# Patient Record
Sex: MALE | Race: BLACK OR AFRICAN AMERICAN | NOT HISPANIC OR LATINO | Employment: STUDENT | URBAN - METROPOLITAN AREA
[De-identification: names, ages, dates, MRNs, and addresses within clinical notes are randomized per-mention and may not be internally consistent; named-entity substitution may affect disease eponyms.]

---
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
## 2023-06-26 ENCOUNTER — HOSPITAL ENCOUNTER (EMERGENCY)
Facility: HOSPITAL | Age: 18
Discharge: HOME | End: 2023-06-26
Attending: EMERGENCY MEDICINE
Payer: MEDICAID

## 2023-06-26 VITALS
WEIGHT: 132.25 LBS | SYSTOLIC BLOOD PRESSURE: 124 MMHG | TEMPERATURE: 98 F | HEART RATE: 72 BPM | OXYGEN SATURATION: 99 % | DIASTOLIC BLOOD PRESSURE: 84 MMHG | RESPIRATION RATE: 12 BRPM | BODY MASS INDEX: 18.98 KG/M2

## 2023-06-26 DIAGNOSIS — Z71.1 CONCERN ABOUT STD IN MALE WITHOUT DIAGNOSIS: ICD-10-CM

## 2023-06-26 DIAGNOSIS — R36.9 PENILE DISCHARGE: Primary | ICD-10-CM

## 2023-06-26 LAB
AMORPH CRY UR QL COMP ASSIST: ABNORMAL
BACTERIA #/AREA URNS AUTO: ABNORMAL /HPF
BILIRUB UR QL STRIP: NEGATIVE
CLARITY UR REFRACT.AUTO: ABNORMAL
COLOR UR AUTO: YELLOW
GLUCOSE UR QL STRIP: NEGATIVE
HGB UR QL STRIP: ABNORMAL
KETONES UR QL STRIP: NEGATIVE
LEUKOCYTE ESTERASE UR QL STRIP: ABNORMAL
MICROSCOPIC COMMENT: ABNORMAL
NITRITE UR QL STRIP: NEGATIVE
PH UR STRIP: 8 [PH] (ref 5–8)
PROT UR QL STRIP: ABNORMAL
RBC #/AREA URNS AUTO: 26 /HPF (ref 0–4)
SP GR UR STRIP: 1.02 (ref 1–1.03)
URN SPEC COLLECT METH UR: ABNORMAL
WBC #/AREA URNS AUTO: >100 /HPF (ref 0–5)

## 2023-06-26 PROCEDURE — 25000003 PHARM REV CODE 250: Performed by: PHYSICIAN ASSISTANT

## 2023-06-26 PROCEDURE — 99284 EMERGENCY DEPT VISIT MOD MDM: CPT

## 2023-06-26 PROCEDURE — 87591 N.GONORRHOEAE DNA AMP PROB: CPT | Performed by: PHYSICIAN ASSISTANT

## 2023-06-26 PROCEDURE — 63600175 PHARM REV CODE 636 W HCPCS: Performed by: PHYSICIAN ASSISTANT

## 2023-06-26 PROCEDURE — 87086 URINE CULTURE/COLONY COUNT: CPT | Performed by: PHYSICIAN ASSISTANT

## 2023-06-26 PROCEDURE — 81001 URINALYSIS AUTO W/SCOPE: CPT | Performed by: PHYSICIAN ASSISTANT

## 2023-06-26 PROCEDURE — 96372 THER/PROPH/DIAG INJ SC/IM: CPT | Performed by: PHYSICIAN ASSISTANT

## 2023-06-26 RX ORDER — CEFTRIAXONE 500 MG/1
500 INJECTION, POWDER, FOR SOLUTION INTRAMUSCULAR; INTRAVENOUS
Status: COMPLETED | OUTPATIENT
Start: 2023-06-26 | End: 2023-06-26

## 2023-06-26 RX ORDER — DOXYCYCLINE HYCLATE 100 MG
100 TABLET ORAL
Status: COMPLETED | OUTPATIENT
Start: 2023-06-26 | End: 2023-06-26

## 2023-06-26 RX ORDER — DOXYCYCLINE 100 MG/1
100 CAPSULE ORAL EVERY 12 HOURS
Qty: 14 CAPSULE | Refills: 0 | Status: SHIPPED | OUTPATIENT
Start: 2023-06-26 | End: 2023-07-03

## 2023-06-26 RX ADMIN — DOXYCYCLINE HYCLATE 100 MG: 100 TABLET, COATED ORAL at 09:06

## 2023-06-26 RX ADMIN — CEFTRIAXONE SODIUM 500 MG: 500 INJECTION, POWDER, FOR SOLUTION INTRAMUSCULAR; INTRAVENOUS at 09:06

## 2023-06-27 NOTE — ED NOTES
Discharge home, states understanding to follow up as directed. Ambulates out of ED without difficulty. RX given, tolerated shot time without difficulty

## 2023-06-27 NOTE — DISCHARGE INSTRUCTIONS
You were given an injection of antibiotics for treatment of gonorrhea and 1st dose of doxycycline for treatment of chlamydia.  You will need to continue doxycycline every 12 hours for the next 7 days for full treatment of chlamydia.  Your results are in process and will come back in 1-3 days.  We will call you if your positive.  You can refer to your chart all mine and review your labs when they come back.    Avoid all sexual activity until your symptoms resolve +2 more weeks.    Follow up with PCP regularly for STD yearly checks.  Return to the ER for new or worsening symptoms.

## 2023-06-27 NOTE — ED PROVIDER NOTES
Encounter Date: 6/26/2023       History     Chief Complaint   Patient presents with    Groin Pain     Pt arrives c/o groin pain that began yesterday. Denies burning while urinating. State brown discharge and sexually active.     9:42 PM  Patient is a 18-year-old male with a history of ADHD who presents to [facility] ED via POV for emergent evaluation of penile discharge onset yesterday.     He has had brown discharge since yesterday.  Mild dysuria. No hematuria.  He denies any pain including groin pain, penile pain, testicular pain.  He is sexually active and does not use protection. He has not noted any rashes.    This is the extent of his medical complaints at this time.      Review of patient's allergies indicates:  No Known Allergies  Past Medical History:   Diagnosis Date    ADHD (attention deficit hyperactivity disorder)      No past surgical history on file.  Family History   Problem Relation Age of Onset    Hypertension Mother     Hypertension Father     Hypertension Maternal Aunt     Hypertension Maternal Uncle     Hypertension Paternal Aunt     Hypertension Paternal Uncle     Hypertension Maternal Grandmother     Arrhythmia Maternal Grandfather     Cardiomyopathy Maternal Grandfather     Hypertension Maternal Grandfather     Hypertension Paternal Grandmother     Hypertension Paternal Grandfather     Heart attacks under age 50 Neg Hx     Pacemaker/defibrilator Neg Hx      Social History     Tobacco Use    Smoking status: Never    Smokeless tobacco: Never   Substance Use Topics    Alcohol use: Not Currently    Drug use: Not Currently     Review of Systems   Constitutional:  Negative for chills, diaphoresis and fever.   HENT:  Negative for sore throat.    Respiratory:  Negative for shortness of breath.    Cardiovascular:  Negative for chest pain.   Gastrointestinal:  Negative for abdominal pain, diarrhea, nausea and vomiting.   Genitourinary:  Positive for dysuria and penile discharge. Negative for hematuria, penile  "pain, penile swelling, scrotal swelling and testicular pain.   Musculoskeletal:  Negative for back pain.   Skin:  Negative for rash.   Neurological:  Negative for weakness.   Hematological:  Does not bruise/bleed easily.     Physical Exam     Initial Vitals [06/26/23 2051]   BP Pulse Resp Temp SpO2   124/84 72 12 97.8 °F (36.6 °C) 99 %      MAP       --         Physical Exam    Vitals reviewed.  Constitutional: He appears well-developed and well-nourished. He is not diaphoretic. He is cooperative.  Non-toxic appearance. He does not have a sickly appearance. He does not appear ill. No distress.   HENT:   Head: Normocephalic and atraumatic.   Nose: Nose normal.   Mouth/Throat: No trismus in the jaw.   Eyes: Conjunctivae and EOM are normal.   Neck:   Normal range of motion.  Pulmonary/Chest: No accessory muscle usage. No tachypnea. No respiratory distress.   Abdominal: He exhibits no distension.   Genitourinary:    Genitourinary Comments: He has a soiled towel with his penile discharge. Appears brown.     Musculoskeletal:         General: Normal range of motion.      Cervical back: Normal range of motion.     Neurological: He is alert. He has normal strength.   Skin: Skin is dry. No pallor.       ED Course   Procedures  Labs Reviewed   C. TRACHOMATIS/N. GONORRHOEAE BY AMP DNA   URINALYSIS, REFLEX TO URINE CULTURE          Imaging Results    None          Medications   cefTRIAXone injection 500 mg (500 mg Intramuscular Given 6/26/23 2146)   doxycycline tablet 100 mg (100 mg Oral Given 6/26/23 2146)     Medical Decision Making:   Initial Assessment:   Patient is a 18-year-old male with a history of ADHD who presents to [facility] ED via POV for emergent evaluation of penile discharge onset yesterday.   Differential Diagnosis:   Includes but is not limited to STDs, UTI.  He denies a rash.  He has mild dysuria, but denies any pain whatsoever despite triage report of "groin pain". I doubt acute surgical abdomen.  Clinical Tests: "   Lab Tests: Ordered  ED Management:  Will screen for gonorrhea and chlamydia.  I sent text message to his phone so he can have access to his my [facility].  He would like empiric treatment due to his penile discharge.  He declines HIV and hep C testing today and would like to do this with his PCP.  I advised regular STD screens if he is sexually active.  Will give Rocephin intramuscular injection and started on doxycycline b.i.d. for 7 days.            ED Course as of 06/26/23 2147 Mon Jun 26, 2023 2122 BP: 124/84 [CL]   2122 Temp: 97.8 °F (36.6 °C) [CL]   2122 Pulse: 72 [CL]   2122 Resp: 12 [CL]   2122 SpO2: 99 % [CL]      ED Course User Index  [CL] Vianca Emmanuel PA-C                 Clinical Impression:   Final diagnoses:  [R36.9] Penile discharge (Primary)  [Z71.1] Concern about STD in male without diagnosis        ED Disposition Condition    Discharge Stable          ED Prescriptions       Medication Sig Dispense Start Date End Date Auth. Provider    doxycycline (VIBRAMYCIN) 100 MG Cap Take 1 capsule (100 mg total) by mouth every 12 (twelve) hours. for 7 days 14 capsule 6/26/2023 7/3/2023 Vianca Emmanuel PA-C          Follow-up Information       Follow up With Specialties Details Why Contact Info    Jessenia Bridges MD Family Medicine Schedule an appointment as soon as possible for a visit   [address] [facility] [address]  [phone]      [facility] - Emergency Dept Emergency Medicine  If symptoms worsen [facility] [facility] [id number] [phone]             Vianca Emmanuel PA-C  06/26/23 2147

## 2023-06-27 NOTE — ED TRIAGE NOTES
Patient reports that he is having brown colored discharge from his penis. States that this began yesterday. States that he is sexually active. Reports that he is not having any pain to area. Reports that it is just the drainage from his penis.

## 2023-06-28 LAB
BACTERIA UR CULT: NORMAL
C TRACH DNA SPEC QL NAA+PROBE: DETECTED
N GONORRHOEA DNA SPEC QL NAA+PROBE: DETECTED

## 2023-12-05 ENCOUNTER — HOSPITAL ENCOUNTER (EMERGENCY)
Facility: HOSPITAL | Age: 18
Discharge: HOME | End: 2023-12-05
Attending: EMERGENCY MEDICINE
Payer: MEDICAID

## 2023-12-05 VITALS
HEART RATE: 90 BPM | RESPIRATION RATE: 16 BRPM | OXYGEN SATURATION: 99 % | WEIGHT: 132.25 LBS | DIASTOLIC BLOOD PRESSURE: 77 MMHG | SYSTOLIC BLOOD PRESSURE: 126 MMHG | TEMPERATURE: 98 F | BODY MASS INDEX: 18.98 KG/M2

## 2023-12-05 DIAGNOSIS — Z71.1 CONCERN ABOUT STD IN MALE WITHOUT DIAGNOSIS: ICD-10-CM

## 2023-12-05 DIAGNOSIS — R36.9 PENILE DISCHARGE: ICD-10-CM

## 2023-12-05 DIAGNOSIS — N48.9 PENILE LESION: ICD-10-CM

## 2023-12-05 DIAGNOSIS — Z20.2 EXPOSURE TO STD: Primary | ICD-10-CM

## 2023-12-05 LAB
HCV AB SERPL QL IA: NORMAL
HIV 1+2 AB+HIV1 P24 AG SERPL QL IA: NORMAL

## 2023-12-05 PROCEDURE — 99284 EMERGENCY DEPT VISIT MOD MDM: CPT

## 2023-12-05 PROCEDURE — 63600175 PHARM REV CODE 636 W HCPCS: Performed by: EMERGENCY MEDICINE

## 2023-12-05 PROCEDURE — 87661 TRICHOMONAS VAGINALIS AMPLIF: CPT | Performed by: PHYSICIAN ASSISTANT

## 2023-12-05 PROCEDURE — 86803 HEPATITIS C AB TEST: CPT | Performed by: PHYSICIAN ASSISTANT

## 2023-12-05 PROCEDURE — 25000003 PHARM REV CODE 250: Performed by: EMERGENCY MEDICINE

## 2023-12-05 PROCEDURE — 87389 HIV-1 AG W/HIV-1&-2 AB AG IA: CPT | Performed by: PHYSICIAN ASSISTANT

## 2023-12-05 PROCEDURE — 87529 HSV DNA AMP PROBE: CPT | Mod: 59 | Performed by: EMERGENCY MEDICINE

## 2023-12-05 PROCEDURE — 86592 SYPHILIS TEST NON-TREP QUAL: CPT | Performed by: EMERGENCY MEDICINE

## 2023-12-05 PROCEDURE — 96372 THER/PROPH/DIAG INJ SC/IM: CPT | Performed by: EMERGENCY MEDICINE

## 2023-12-05 RX ORDER — CEFTRIAXONE 500 MG/1
500 INJECTION, POWDER, FOR SOLUTION INTRAMUSCULAR; INTRAVENOUS
Status: COMPLETED | OUTPATIENT
Start: 2023-12-05 | End: 2023-12-05

## 2023-12-05 RX ORDER — DOXYCYCLINE HYCLATE 100 MG
100 TABLET ORAL
Status: COMPLETED | OUTPATIENT
Start: 2023-12-05 | End: 2023-12-05

## 2023-12-05 RX ORDER — DOXYCYCLINE 100 MG/1
100 CAPSULE ORAL 2 TIMES DAILY
Qty: 20 CAPSULE | Refills: 0 | Status: SHIPPED | OUTPATIENT
Start: 2023-12-05 | End: 2023-12-15

## 2023-12-05 RX ORDER — ONDANSETRON 4 MG/1
4 TABLET, ORALLY DISINTEGRATING ORAL
Status: COMPLETED | OUTPATIENT
Start: 2023-12-05 | End: 2023-12-05

## 2023-12-05 RX ADMIN — ONDANSETRON 4 MG: 4 TABLET, ORALLY DISINTEGRATING ORAL at 05:12

## 2023-12-05 RX ADMIN — CEFTRIAXONE SODIUM 500 MG: 500 INJECTION, POWDER, FOR SOLUTION INTRAMUSCULAR; INTRAVENOUS at 04:12

## 2023-12-05 RX ADMIN — DOXYCYCLINE HYCLATE 100 MG: 100 TABLET, FILM COATED ORAL at 04:12

## 2023-12-05 NOTE — ED NOTES
Attempted to collect blood. Stuck pt. Once but did not have blood return. Pt. Refuses to have second attempt. Dr. Gutierrez notified.

## 2023-12-05 NOTE — ED PROVIDER NOTES
Encounter Date: 12/5/2023       History     Chief Complaint   Patient presents with    STD CHECK     Blood in urine, discharge     Krupa is a 18 you male o/w healthy here for emergent evaluation of discharge from penis that started yesterday. Reports also seeing a little blood when he finished peeing but denies dysuria. No fever or chills. No testicle or groin pain .denies nausea/vomiting, abdominal pain, fever or chills. Reports he has had unprotected sex x 1. Female partner. Was seen here for similar in June, was positive for both chlamydia and gonorrhea, reports was treated and completed his meds, has been fine until yesterday.     The history is provided by the patient. No  was used.     Review of patient's allergies indicates:  No Known Allergies  Past Medical History:   Diagnosis Date    ADHD (attention deficit hyperactivity disorder)      No past surgical history on file.  Family History   Problem Relation Age of Onset    Hypertension Mother     Hypertension Father     Hypertension Maternal Aunt     Hypertension Maternal Uncle     Hypertension Paternal Aunt     Hypertension Paternal Uncle     Hypertension Maternal Grandmother     Arrhythmia Maternal Grandfather     Cardiomyopathy Maternal Grandfather     Hypertension Maternal Grandfather     Hypertension Paternal Grandmother     Hypertension Paternal Grandfather     Heart attacks under age 50 Neg Hx     Pacemaker/defibrilator Neg Hx      Social History     Tobacco Use    Smoking status: Never    Smokeless tobacco: Never   Substance Use Topics    Alcohol use: Not Currently    Drug use: Not Currently     Review of Systems   Constitutional:  Positive for activity change. Negative for appetite change, chills, fatigue and fever.   HENT:  Negative for congestion.    Eyes:  Negative for redness.   Respiratory:  Negative for cough and shortness of breath.    Gastrointestinal:  Negative for abdominal pain, diarrhea, nausea and vomiting.    Genitourinary:  Positive for hematuria and penile discharge. Negative for dysuria.   Musculoskeletal:  Negative for myalgias.   Skin:  Negative for rash.   Allergic/Immunologic: Negative for food allergies.       Physical Exam     Initial Vitals [12/05/23 1514]   BP Pulse Resp Temp SpO2   129/78 88 18 98.1 °F (36.7 °C) 100 %      MAP       --         Physical Exam    Vitals reviewed.  Constitutional: He appears well-developed and well-nourished. No distress.   HENT:   Head: Normocephalic.   Mouth/Throat: No oropharyngeal exudate.   Eyes: Conjunctivae are normal.   Cardiovascular:  Normal rate, regular rhythm and normal heart sounds.           Pulmonary/Chest: Breath sounds normal. No respiratory distress.   Abdominal: Abdomen is soft. He exhibits no distension. There is no abdominal tenderness.   Genitourinary: Discharge found.    Genitourinary Comments: Mikael 5 male circ , with noted non tender sores to the dorsum of penis ulcerated, also with thick purulence discharge from penis, no noted rash present to the suprapubic area or to testicles, scrotum normal       Skin: Skin is warm. Capillary refill takes less than 2 seconds. No rash noted.   Psychiatric: He has a normal mood and affect.         ED Course   Procedures  Labs Reviewed   C. TRACHOMATIS/N. GONORRHOEAE BY AMP DNA   C. TRACHOMATIS/N. GONORRHOEAE BY AMP DNA   TRICHOMONAS VAGINALIS, RNA,QUAL, URINE   HIV 1 / 2 ANTIBODY   HEPATITIS C ANTIBODY   URINALYSIS, REFLEX TO URINE CULTURE   RPR   HERPES SIMPLEX VIRUS (HSV) TYPE 1 & 2 DNA BY PCR          Imaging Results    None          Medications   cefTRIAXone injection 500 mg (500 mg Intramuscular Given 12/5/23 1656)   doxycycline tablet 100 mg (100 mg Oral Given 12/5/23 1656)   ondansetron disintegrating tablet 4 mg (4 mg Oral Given 12/5/23 5909)     Medical Decision Making  Krupa presents for emergent evaluation of penile discharge, noted to have lesions to his dorsum penis. He did not know about these  lesions and did not know they were present, denies tenderness-which makes me concerned that is c/w chancre from syphilis. Will order screening labs, including HSV and RPR.  Patient initially denying blood work however we were able to convince him to complete the blood work.  Treated with ceftriaxone and doxy for likely STI as well.  Safe sex practices and clear return to ER instructions were reviewed.     Patient gave his mother's cell phone number to call with results ( the [phone] number) .  He would prefer us to call his mom and gave us full clearance to tell his mom any results.     Amount and/or Complexity of Data Reviewed  External Data Reviewed: notes.  Labs: ordered.    Risk  Prescription drug management.                                      Clinical Impression:  Final diagnoses:  [Z20.2] Exposure to STD (Primary)  [Z71.1] Concern about STD in male without diagnosis  [R36.9] Penile discharge  [N48.9] Penile lesion          ED Disposition Condition    Discharge Stable          ED Prescriptions       Medication Sig Dispense Start Date End Date Auth. Provider    doxycycline (VIBRAMYCIN) 100 MG Cap Take 1 capsule (100 mg total) by mouth 2 (two) times daily. for 10 days 20 capsule 12/5/2023 12/15/2023 Anabela Gutierrez MD          Follow-up Information    None          Anabela Gutierrez MD  12/05/23 2524

## 2023-12-06 LAB
RPR SER QL: NORMAL
SPECIMEN SOURCE: NORMAL
T VAGINALIS RRNA SPEC QL NAA+PROBE: NEGATIVE

## 2023-12-07 LAB
HSV-1 DNA BY PCR: NEGATIVE
HSV-2 DNA BY PCR: NEGATIVE

## 2024-03-15 ENCOUNTER — HOSPITAL ENCOUNTER (EMERGENCY)
Facility: HOSPITAL | Age: 19
Discharge: HOME | End: 2024-03-15
Attending: STUDENT IN AN ORGANIZED HEALTH CARE EDUCATION/TRAINING PROGRAM
Payer: MEDICAID

## 2024-03-15 VITALS
HEIGHT: 67 IN | RESPIRATION RATE: 16 BRPM | OXYGEN SATURATION: 99 % | DIASTOLIC BLOOD PRESSURE: 68 MMHG | HEART RATE: 98 BPM | SYSTOLIC BLOOD PRESSURE: 114 MMHG | BODY MASS INDEX: 19.62 KG/M2 | TEMPERATURE: 99 F | WEIGHT: 125 LBS

## 2024-03-15 DIAGNOSIS — R11.2 NAUSEA AND VOMITING, UNSPECIFIED VOMITING TYPE: Primary | ICD-10-CM

## 2024-03-15 DIAGNOSIS — R19.7 DIARRHEA, UNSPECIFIED TYPE: ICD-10-CM

## 2024-03-15 PROCEDURE — 99284 EMERGENCY DEPT VISIT MOD MDM: CPT

## 2024-03-15 RX ORDER — DICYCLOMINE HYDROCHLORIDE 20 MG/1
20 TABLET ORAL 2 TIMES DAILY
Qty: 20 TABLET | Refills: 0 | Status: SHIPPED | OUTPATIENT
Start: 2024-03-15 | End: 2024-04-14

## 2024-03-15 RX ORDER — DICYCLOMINE HYDROCHLORIDE 10 MG/1
20 CAPSULE ORAL
Status: DISCONTINUED | OUTPATIENT
Start: 2024-03-15 | End: 2024-03-15

## 2024-03-15 RX ORDER — ONDANSETRON 4 MG/1
4 TABLET, ORALLY DISINTEGRATING ORAL
Status: DISCONTINUED | OUTPATIENT
Start: 2024-03-15 | End: 2024-03-15 | Stop reason: HOSPADM

## 2024-03-15 RX ORDER — ONDANSETRON 4 MG/1
4 TABLET, ORALLY DISINTEGRATING ORAL EVERY 6 HOURS PRN
Qty: 12 TABLET | Refills: 0 | Status: SHIPPED | OUTPATIENT
Start: 2024-03-15

## 2024-03-15 RX ORDER — ONDANSETRON HYDROCHLORIDE 2 MG/ML
4 INJECTION, SOLUTION INTRAVENOUS
Status: DISCONTINUED | OUTPATIENT
Start: 2024-03-15 | End: 2024-03-15

## 2024-03-15 RX ORDER — DICYCLOMINE HYDROCHLORIDE 10 MG/1
20 CAPSULE ORAL
Status: DISCONTINUED | OUTPATIENT
Start: 2024-03-15 | End: 2024-03-15 | Stop reason: HOSPADM

## 2024-03-15 NOTE — ED PROVIDER NOTES
Encounter Date: 3/15/2024       History     Chief Complaint   Patient presents with    Diarrhea    Vomiting    Chills     18-year-old male presents ER for evaluation of abdominal pain, nausea vomiting and diarrhea that started earlier today.  He reports some abdominal cramping.  Had 1 episode of diarrhea.  No blood in stool or black tarry stool.  Denies any flank pain or UTI symptoms.  No fever chills.  Denies any known sick contacts.  No changes in diet or medication.  No recent long travel or hospitalizations.  No URI symptoms.  Did not take any medicine prior to arrival to ER today.    Have any symptoms at this time.  He states that symptoms have fully resolved.    The history is provided by the patient.     Review of patient's allergies indicates:  No Known Allergies  Past Medical History:   Diagnosis Date    ADHD (attention deficit hyperactivity disorder)      History reviewed. No pertinent surgical history.  Family History   Problem Relation Age of Onset    Hypertension Mother     Hypertension Father     Hypertension Maternal Aunt     Hypertension Maternal Uncle     Hypertension Paternal Aunt     Hypertension Paternal Uncle     Hypertension Maternal Grandmother     Arrhythmia Maternal Grandfather     Cardiomyopathy Maternal Grandfather     Hypertension Maternal Grandfather     Hypertension Paternal Grandmother     Hypertension Paternal Grandfather     Heart attacks under age 50 Neg Hx     Pacemaker/defibrilator Neg Hx      Social History     Tobacco Use    Smoking status: Never    Smokeless tobacco: Never   Substance Use Topics    Alcohol use: Not Currently    Drug use: Not Currently     Review of Systems   Constitutional:  Negative for chills and fever.   HENT:  Negative for congestion.    Eyes:  Negative for visual disturbance.   Respiratory:  Negative for shortness of breath.    Cardiovascular:  Negative for chest pain.   Gastrointestinal:  Positive for abdominal pain, diarrhea, nausea and vomiting.    Genitourinary:  Negative for dysuria and flank pain.   Musculoskeletal:  Negative for myalgias.   Skin:  Negative for rash.   Allergic/Immunologic: Negative for immunocompromised state.   Neurological:  Negative for weakness and numbness.   Hematological:  Does not bruise/bleed easily.   Psychiatric/Behavioral:  Negative for confusion.        Physical Exam     Initial Vitals [03/15/24 1602]   BP Pulse Resp Temp SpO2   114/68 98 16 98.8 °F (37.1 °C) 99 %      MAP       --         Physical Exam    Vitals reviewed.  Constitutional: He appears well-developed and well-nourished. He is not diaphoretic. No distress.   HENT:   Head: Normocephalic and atraumatic.   Eyes: Conjunctivae and EOM are normal.   Neck: Neck supple.   Cardiovascular:  Normal rate, regular rhythm, normal heart sounds and intact distal pulses.           Pulmonary/Chest: Breath sounds normal. No respiratory distress.   Abdominal: He exhibits distension. There is abdominal tenderness (mild diffuse).   Musculoskeletal:         General: Normal range of motion.      Cervical back: Neck supple.     Neurological: He is alert and oriented to person, place, and time.   Skin: Skin is warm.         ED Course   Procedures  Labs Reviewed   CBC W/ AUTO DIFFERENTIAL   COMPREHENSIVE METABOLIC PANEL   LIPASE   URINALYSIS, REFLEX TO URINE CULTURE          Imaging Results    None          Medications   sodium chloride 0.9% bolus 1,000 mL 1,000 mL (has no administration in time range)   ondansetron disintegrating tablet 4 mg (has no administration in time range)   dicyclomine capsule 20 mg (has no administration in time range)     Medical Decision Making  Differential diagnosis    Gastritis, gastroenteritis, viral syndrome, electrolyte derangement, UTI, dehydration    Amount and/or Complexity of Data Reviewed  Labs: ordered.    Risk  Prescription drug management.         APC / Resident Notes:   Patient seen in the ER promptly upon arrival.  He is afebrile, no acute  distress.  Physical examination reveals mild diffuse tenderness on palpation throughout the abdomen.  Abdomen is soft, nondistended.  No CVA tenderness on exam.  IV access established, labs ordered, fluids given.  Patient given Bentyl and Zofran.        ED Course as of 03/15/24 2030   Fri Mar 15, 2024   1914 Informed by staff that patient does not want any blood work or IV medication.  He wants oral medication and to be discharged immediately. [AJ]   1914 He does not have any symptoms at this time. [AJ]   2029 Patient tolerated the Zofran and Bentyl given to him.  Will prescribed home on Zofran and Bentyl.  Will advise a bland diet and slowly progress diet as tolerated.  Explained to patient that he may require further lab work and imaging to determine cause of symptoms.  Suspect possible gastroenteritis however other abdominal etiology not excluded.  Patient walked out of the emergency room in stable condition [AJ]      ED Course User Index  [AJ] Stephanie Greene PA-C                           Clinical Impression:  Final diagnoses:  [R11.2] Nausea and vomiting, unspecified vomiting type (Primary)  [R19.7] Diarrhea, unspecified type          ED Disposition Condition    Discharge Stable          ED Prescriptions       Medication Sig Dispense Start Date End Date Auth. Provider    ondansetron (ZOFRAN-ODT) 4 MG TbDL Take 1 tablet (4 mg total) by mouth every 6 (six) hours as needed. 12 tablet 3/15/2024 -- Stephanie Greene PA-C    dicyclomine (BENTYL) 20 mg tablet Take 1 tablet (20 mg total) by mouth 2 (two) times daily. 20 tablet 3/15/2024 4/14/2024 Stephanie Greene PA-C          Follow-up Information       Follow up With Specialties Details Why Contact Info Additional Information    [facility]   [address]  [facility] [address] [phone] [facility] Please park in surface lot and check in at central registration desk              Stephanie Greene PA-C  03/15/24 2030

## 2024-03-15 NOTE — ED TRIAGE NOTES
Krupa Faith, a 18 y.o. male presents to the ED w/ complaint of abdominal pain    Triage note:  Chief Complaint   Patient presents with    Diarrhea    Vomiting    Chills     Review of patient's allergies indicates:  No Known Allergies  Past Medical History:   Diagnosis Date    ADHD (attention deficit hyperactivity disorder)           LOC: The patient is awake, alert and aware of environment with an appropriate affect, the patient is oriented x 3 and speaking appropriately.   APPEARANCE: Patient appears comfortable and in no acute distress, patient is clean and well groomed.  SKIN: The skin is warm and dry, color consistent with ethnicity, patient has normal skin turgor and moist mucus membranes, skin intact, no breakdown or bruising noted.   MUSCULOSKELETAL: Patient moving all extremities spontaneously, no swelling noted.  RESPIRATORY: Airway is open and patent, respirations are spontaneous, patient has a normal effort and rate, no accessory muscle use noted, pt placed on pulse ox  SPO2 noted at 99% on room air.  CARDIAC: Patient has a normal rate, no edema noted, capillary refill < 3 seconds.   GASTRO: Soft and non tender to palpation, no distention noted. Pt states bowel movements have been irregular with diarrhea.  : Pt denies any pain or frequency with urination.  NEURO: Pt opens eyes spontaneously, behavior appropriate to situation, follows commands, facial expression symmetrical, bilateral hand grasp equal and even, purposeful motor response noted.  PAIN:  9/10

## 2024-07-11 ENCOUNTER — HOSPITAL ENCOUNTER (EMERGENCY)
Facility: HOSPITAL | Age: 19
Discharge: HOME | End: 2024-07-11
Attending: EMERGENCY MEDICINE
Payer: MEDICAID

## 2024-07-11 VITALS
HEART RATE: 113 BPM | RESPIRATION RATE: 20 BRPM | OXYGEN SATURATION: 96 % | TEMPERATURE: 98 F | DIASTOLIC BLOOD PRESSURE: 64 MMHG | SYSTOLIC BLOOD PRESSURE: 108 MMHG

## 2024-07-11 DIAGNOSIS — R55 SYNCOPE: ICD-10-CM

## 2024-07-11 PROCEDURE — 93005 ELECTROCARDIOGRAM TRACING: CPT

## 2024-07-11 PROCEDURE — 99283 EMERGENCY DEPT VISIT LOW MDM: CPT | Mod: 25

## 2024-07-11 PROCEDURE — 93010 ELECTROCARDIOGRAM REPORT: CPT | Mod: ,,, | Performed by: INTERNAL MEDICINE

## 2024-07-11 PROCEDURE — 25000003 PHARM REV CODE 250: Performed by: EMERGENCY MEDICINE

## 2024-07-11 RX ORDER — ACETAMINOPHEN 500 MG
1000 TABLET ORAL
Status: COMPLETED | OUTPATIENT
Start: 2024-07-11 | End: 2024-07-11

## 2024-07-11 RX ADMIN — ACETAMINOPHEN 1000 MG: 500 TABLET ORAL at 07:07

## 2024-07-11 NOTE — ED TRIAGE NOTES
I spoke with the patient. Notified her of Dr. Shukla' recommendations. INR appointment scheduled for 01/10/2023.    Krupa Faith, a 19 y.o. male presents to the ED w/ complaint of positive syncopal episode per mother, fell and hit head.    Triage note:  Chief Complaint   Patient presents with    Vomiting     Found down by mom on corner, pt states he got overheated, denies LOC, arrives with vomit on clothing. Denies any complaints at this time. Feels better after vomiting     Review of patient's allergies indicates:  No Known Allergies  Past Medical History:   Diagnosis Date    ADHD (attention deficit hyperactivity disorder)          APPEARANCE: awake and alert in NAD. PAIN  7/10  SKIN: warm, dry and intact. No breakdown or bruising.  MUSCULOSKELETAL: Patient moving all extremities spontaneously, no obvious swelling or deformities noted. Ambulates independently.  RESPIRATORY: Denies shortness of breath.Respirations unlabored.   CARDIAC: Denies CP, 2+ distal pulses; no peripheral edema  ABDOMEN: S/ND/NT, endorses nausea  : voids spontaneously, denies difficulty  Neurologic: AAO x 4; follows commands equal strength in all extremities; denies numbness/tingling. Denies dizziness

## 2024-07-11 NOTE — ED PROVIDER NOTES
Encounter Date: 7/11/2024       History     Chief Complaint   Patient presents with    Vomiting     Found down by mom on corner, pt states he got overheated, denies LOC, arrives with vomit on clothing. Denies any complaints at this time. Feels better after vomiting     19-year-old male with history of ADHD presents with a spell.  He states that he was walking to the store this evening and he has not sure what happened.  His mother was not there.  A relative on the phone who went to get him states that she did not see any seizure activity.  He was quite diaphoretic.  The person on the phone said that someone drove by and said that he may have been shaking but there was no description of seizure activity.  He vomited twice in the car.  He has a mild headache to the forehead.  He states he had an eaten all day.  He woke up at 7 in the morning has been playing video games today.  He denies any nausea vomiting chest pain palpitations or shortness of breath.  There has been no fevers, vomiting prior to today or diarrhea.  He has never passed out before.        Review of patient's allergies indicates:  No Known Allergies  Past Medical History:   Diagnosis Date    ADHD (attention deficit hyperactivity disorder)      History reviewed. No pertinent surgical history.  Family History   Problem Relation Name Age of Onset    Hypertension Mother      Hypertension Father      Hypertension Maternal Aunt      Hypertension Maternal Uncle      Hypertension Paternal Aunt      Hypertension Paternal Uncle      Hypertension Maternal Grandmother      Arrhythmia Maternal Grandfather      Cardiomyopathy Maternal Grandfather      Hypertension Maternal Grandfather      Hypertension Paternal Grandmother      Hypertension Paternal Grandfather      Heart attacks under age 50 Neg Hx      Pacemaker/defibrilator Neg Hx       Social History     Tobacco Use    Smoking status: Never    Smokeless tobacco: Never   Substance Use Topics    Alcohol use: Not  Currently    Drug use: Not Currently     Review of Systems    Physical Exam     Initial Vitals [07/11/24 1830]   BP Pulse Resp Temp SpO2   108/64 (!) 113 20 97.5 °F (36.4 °C) 96 %      MAP       --         Physical Exam    Constitutional: He appears well-developed and well-nourished. He is not diaphoretic. No distress.   HENT:   Head: Normocephalic and atraumatic.   Right Ear: External ear normal.   Left Ear: External ear normal.   Mouth/Throat: Oropharynx is clear and moist.   There are teeth marks to the side of his tongue   Eyes: Conjunctivae and EOM are normal. Pupils are equal, round, and reactive to light.   Neck: Neck supple. No JVD present.   Normal range of motion.  Cardiovascular:  Normal rate, regular rhythm, normal heart sounds and intact distal pulses.     Exam reveals no gallop and no friction rub.       No murmur heard.  Pulmonary/Chest: Breath sounds normal. No respiratory distress. He has no wheezes. He has no rales.   Abdominal: Abdomen is soft. Bowel sounds are normal. He exhibits no distension. There is no abdominal tenderness. There is no rebound.   Musculoskeletal:         General: No tenderness or edema. Normal range of motion.      Cervical back: Normal range of motion and neck supple.     Neurological: He is alert and oriented to person, place, and time. He has normal strength. No cranial nerve deficit or sensory deficit. GCS score is 15. GCS eye subscore is 4. GCS verbal subscore is 5. GCS motor subscore is 6.   Psychiatric: He has a normal mood and affect. His behavior is normal. Thought content normal.         ED Course   Procedures  Labs Reviewed   CBC W/ AUTO DIFFERENTIAL   COMPREHENSIVE METABOLIC PANEL   TROPONIN I     EKG Readings: (Independently Interpreted)   Normal sinus rhythm at 99 without acute ischemic changes.  I do not see a delta wave.  His QTC is normal at 464.  No Brugada syndrome like pattern to V1 through V3.         Imaging Results    None          Medications   sodium  chloride 0.9% bolus 1,000 mL 1,000 mL (has no administration in time range)   acetaminophen tablet 1,000 mg (1,000 mg Oral Given 7/11/24 1908)     Medical Decision Making  Patient with an episode where he lost consciousness.  Differential diagnosis includes arrhythmia seizure pulmonary embolus electrolyte abnormalities and other emergent conditions..  It did not seem to happened with exertion and he is not have an exertional symptoms such as syncope or chest discomfort.  EKG is reassuring.  Finding on his tongue does make one wonder if this was a seizure.  He does not have a history of seizures.  There was no fecal or urinary incontinence.  I did not get a description of a postictal period.  He has no symptoms of pulmonary embolus.  He initially was tachycardic but that is resolving spontaneously in the rest of his vital signs are reassuring.  No findings of arrhythmia on his EKG.  Would like to order basic blood work do a chest x-ray head CT and give IV fluids.  He is declining blood work an IV at this time but will see if his mother will convince him.    Patient later refused all further evaluation.  He is requesting to be discharged.  I discussed concerns that this could have been a seizure or heart problem or other serious emergency.  Recommended doing evaluation.  He states that he would like to be released.  He seems to have the capacity to make this decision.  He has not intoxicated or disoriented.  I discharged him against my medical advice.    Amount and/or Complexity of Data Reviewed  Labs: ordered.  Radiology: ordered.    Risk  OTC drugs.                                      Clinical Impression:  Final diagnoses:  [R55] Syncope          ED Disposition Condition    AMA Stable                Bebo Rincon MD  07/11/24 1944

## 2024-07-12 LAB
OHS QRS DURATION: 84 MS
OHS QTC CALCULATION: 464 MS

## 2024-07-12 NOTE — DISCHARGE INSTRUCTIONS
Please understand you are leaving against medical advice.  We are not sure what caused this episode and there is plenty of potential life-threatening causes.  Please follow-up with your doctor.  Please return here if you change your mind or have any other concerning symptoms.

## 2024-07-12 NOTE — ED NOTES
"Pt refusing IV, blood work, fluids and treatment at this time. Pt and family member educated on importance of medical treatment and workup. Pt states 'I feel alright I dont want none of that". Dr. Rincon notified and aware.  "